# Patient Record
Sex: MALE | Race: WHITE | URBAN - METROPOLITAN AREA
[De-identification: names, ages, dates, MRNs, and addresses within clinical notes are randomized per-mention and may not be internally consistent; named-entity substitution may affect disease eponyms.]

---

## 2022-03-28 ENCOUNTER — TRANSCRIBE ORDER (OUTPATIENT)
Dept: SCHEDULING | Age: 42
End: 2022-03-28

## 2022-03-28 DIAGNOSIS — R10.9 UNSPECIFIED ABDOMINAL PAIN: Primary | ICD-10-CM

## 2022-04-07 ENCOUNTER — APPOINTMENT (OUTPATIENT)
Dept: CT IMAGING | Age: 42
End: 2022-04-07
Attending: FAMILY MEDICINE
Payer: COMMERCIAL

## 2022-04-07 ENCOUNTER — HOSPITAL ENCOUNTER (EMERGENCY)
Age: 42
Discharge: HOME | End: 2022-04-07
Attending: FAMILY MEDICINE
Payer: COMMERCIAL

## 2022-04-07 VITALS
SYSTOLIC BLOOD PRESSURE: 133 MMHG | TEMPERATURE: 98.2 F | HEIGHT: 69 IN | RESPIRATION RATE: 18 BRPM | BODY MASS INDEX: 23.7 KG/M2 | HEART RATE: 62 BPM | WEIGHT: 160 LBS | DIASTOLIC BLOOD PRESSURE: 89 MMHG | OXYGEN SATURATION: 100 %

## 2022-04-07 DIAGNOSIS — S32.010S CLOSED WEDGE COMPRESSION FRACTURE OF L1 VERTEBRA, SEQUELA: ICD-10-CM

## 2022-04-07 DIAGNOSIS — M50.30 DEGENERATIVE DISC DISEASE, CERVICAL: Primary | ICD-10-CM

## 2022-04-07 PROCEDURE — 99284 EMERGENCY DEPT VISIT MOD MDM: CPT

## 2022-04-07 PROCEDURE — 72125 CT NECK SPINE W/O DYE: CPT

## 2022-04-07 PROCEDURE — 72131 CT LUMBAR SPINE W/O DYE: CPT

## 2022-04-07 PROCEDURE — 70450 CT HEAD/BRAIN W/O DYE: CPT

## 2022-04-07 PROCEDURE — 74011250636 HC RX REV CODE- 250/636: Performed by: FAMILY MEDICINE

## 2022-04-07 PROCEDURE — 96372 THER/PROPH/DIAG INJ SC/IM: CPT

## 2022-04-07 RX ORDER — KETOROLAC TROMETHAMINE 30 MG/ML
30 INJECTION, SOLUTION INTRAMUSCULAR; INTRAVENOUS
Status: COMPLETED | OUTPATIENT
Start: 2022-04-07 | End: 2022-04-07

## 2022-04-07 RX ORDER — NAPROXEN 500 MG/1
500 TABLET ORAL
Qty: 20 TABLET | Refills: 0 | Status: SHIPPED | OUTPATIENT
Start: 2022-04-07 | End: 2022-04-17

## 2022-04-07 RX ORDER — PREDNISONE 20 MG/1
40 TABLET ORAL DAILY
Qty: 10 TABLET | Refills: 0 | Status: SHIPPED | OUTPATIENT
Start: 2022-04-07 | End: 2022-04-12

## 2022-04-07 RX ADMIN — KETOROLAC TROMETHAMINE 30 MG: 30 INJECTION, SOLUTION INTRAMUSCULAR; INTRAVENOUS at 09:25

## 2022-04-07 NOTE — DISCHARGE INSTRUCTIONS
Thank you! Thank you for allowing me to care for you in the emergency department. I sincerely hope that you are satisfied with your visit today. It is my goal to provide you with excellent care. Below you will find a list of your labs and imaging from your visit today. Should you have any questions regarding these results please do not hesitate to call the emergency department. Labs -   No results found for this or any previous visit (from the past 12 hour(s)). Radiologic Studies -   CT SPINE CERV WO CONT   Final Result   No demonstrated acute lesion. Please see above. CT SPINE LUMB WO CONT   Final Result   No evident acute injury               CT HEAD WO CONT   Final Result   No evidence of acute intracranial event. CT Results  (Last 48 hours)                 04/07/22 0846  CT SPINE CERV WO CONT Final result    Impression:  No demonstrated acute lesion. Please see above. Narrative:  Exam: Cervical spine CT without intravenous contrast       Comparison: None available       Dose reduction: All CT scans at this facility are performed using dose reduction   optimization techniques as appropriate to perform the exam including the   following: automated exposure control, adjustments of the mA and/or kV according   to patient size, or use of iterative reconstruction technique. Findings: Multilevel degenerative disc disease and facet joint osteoarthritis. There is no recent fracture or significant focal bony lesion. There is no   malalignment or distraction. There is no demonstrated prevertebral soft tissue   swelling. Please note that CT without myelographic contrast may be insensitive   to the presence of spinal canal or paraspinous soft tissue pathology , though   none of significance is suggested by this examination. If there is clinical   concern for such soft tissue disease, MRI may prove useful in this regard, as   indicated. Anne Benson            04/07/22 0846  CT SPINE LUMB WO CONT Final result    Impression:  No evident acute injury                   Narrative:  Exam: Lumbar spine CT without intravenous or myelographic contrast       Comparison: None available       Dose reduction: All CT scans at this facility are performed using dose reduction   optimization techniques as appropriate to perform the exam including the   following: automated exposure control, adjustments of the mA and/or kV according   to patient size, or use of iterative reconstruction technique. Findings: There is 33% anterior wedging deformity of the L1 vertebral body, in   what appears to be a developmental phenomenon and less likely remote fracture. There is no recent fracture, malalignment or distraction. There is no evident   disc herniation. There is no spinal canal or neural outlet foraminal stenosis. There is no significant paraspinous soft tissue lesion. 04/07/22 0840  CT HEAD WO CONT Final result    Impression:  No evidence of acute intracranial event. Narrative:  Exam: Head CT without intravenous contrast       Comparison: None       Dose reduction: All CT scans at this facility are performed using dose reduction   optimization techniques as appropriate to perform the exam including the   following: automated exposure control, adjustments of the mA and/or kV according   to patient size, or use of iterative reconstruction technique. Findings: The exam is negative for evidence of intracranial mass, hemorrhage,   recent infarct, or significant asymmetric lesion. Ventricles and subarachnoid   spaces are within normal limits. . The skull base and calvarium are   unremarkable. CXR Results  (Last 48 hours)      None               If you feel that you have not received excellent quality care or timely care, please ask to speak to the nurse manager. Please choose us in the future for your continued health care needs. ------------------------------------------------------------------------------------------------------------  The exam and treatment you received in the Emergency Department were for an urgent problem and are not intended as complete care. It is important that you follow-up with a doctor, nurse practitioner, or physician assistant to:  (1) confirm your diagnosis,  (2) re-evaluation of changes in your illness and treatment, and  (3) for ongoing care. If your symptoms become worse or you do not improve as expected and you are unable to reach your usual health care provider, you should return to the Emergency Department. We are available 24 hours a day. Please take your discharge instructions with you when you go to your follow-up appointment. If you have any problem arranging a follow-up appointment, contact the Emergency Department immediately. If a prescription has been provided, please have it filled as soon as possible to prevent a delay in treatment. Read the entire medication instruction sheet provided to you by the pharmacy. If you have any questions or reservations about taking the medication due to side effects or interactions with other medications, please call your primary care physician or contact the ER to speak with the charge nurse. Make an appointment with your family doctor or the physician you were referred to for follow-up of this visit as instructed on your discharge paperwork, as this is a mandatory follow-up. Return to the ER if you are unable to be seen or if you are unable to be seen in a timely manner. If you have any problem arranging the follow-up visit, contact the Emergency Department immediately.

## 2022-04-07 NOTE — Clinical Note
[address] [facility] EMERGENCY DEPARTMENT  [place] [facility] [address]  [phone]    Work/School Note    Date: 4/7/2022    To Whom It May concern:    Colt Burton was seen and treated today in the emergency room by the following provider(s):  Attending Provider: Geovanni Crowder DO. Colt Burton is excused from work/school on 4/7/2022 through 4/9/2022. He is medically clear to return to work/school on 4/10/2022.          Sincerely,          Cali Latham DO
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
## 2022-04-30 ENCOUNTER — TELEPHONE ENCOUNTER (OUTPATIENT)
Dept: URBAN - METROPOLITAN AREA CLINIC 121 | Facility: CLINIC | Age: 42
End: 2022-04-30

## 2022-05-01 ENCOUNTER — TELEPHONE ENCOUNTER (OUTPATIENT)
Dept: URBAN - METROPOLITAN AREA CLINIC 121 | Facility: CLINIC | Age: 42
End: 2022-05-01

## 2023-02-18 ENCOUNTER — TRANSCRIBE ORDER (OUTPATIENT)
Dept: EMERGENCY DEPT | Age: 43
End: 2023-02-18

## 2023-02-18 ENCOUNTER — HOSPITAL ENCOUNTER (OUTPATIENT)
Dept: GENERAL RADIOLOGY | Age: 43
End: 2023-02-18
Payer: COMMERCIAL

## 2023-02-18 ENCOUNTER — HOSPITAL ENCOUNTER (OUTPATIENT)
Dept: GENERAL RADIOLOGY | Age: 43
Discharge: HOME | End: 2023-02-18
Payer: COMMERCIAL

## 2023-02-18 ENCOUNTER — HOSPITAL ENCOUNTER (OUTPATIENT)
Dept: GENERAL RADIOLOGY | Age: 43
End: 2023-02-18
Attending: FAMILY MEDICINE
Payer: COMMERCIAL

## 2023-02-18 DIAGNOSIS — M54.16 RADICULOPATHY OF LUMBAR REGION: Primary | ICD-10-CM

## 2023-02-18 DIAGNOSIS — M54.16 RADICULOPATHY, LUMBAR REGION: ICD-10-CM

## 2023-02-18 DIAGNOSIS — M25.551 PAIN IN JOINT OF RIGHT HIP: ICD-10-CM

## 2023-02-18 DIAGNOSIS — M54.16 RADICULOPATHY, LUMBAR REGION: Primary | ICD-10-CM

## 2023-02-18 DIAGNOSIS — M25.551 PAIN IN RIGHT HIP: ICD-10-CM

## 2023-02-18 DIAGNOSIS — M54.16 RADICULOPATHY OF LUMBAR REGION: ICD-10-CM

## 2023-02-18 PROCEDURE — 72100 X-RAY EXAM L-S SPINE 2/3 VWS: CPT

## 2023-02-18 PROCEDURE — 73502 X-RAY EXAM HIP UNI 2-3 VIEWS: CPT

## 2024-02-06 ENCOUNTER — NURSE ONLY (OUTPATIENT)
Age: 44
End: 2024-02-06

## 2024-02-06 DIAGNOSIS — Z31.440 ENCOUNTER OF MALE FOR TESTING FOR GENETIC DISEASE CARRIER STATUS FOR PROCREATIVE MANAGEMENT: Primary | ICD-10-CM

## 2024-02-15 LAB
EGFR GENE MUT TESTED BLD/T: NEGATIVE
KRAS GENE MUT ANL BLD/T: NEGATIVE
Lab: NEGATIVE
Lab: NORMAL
MICROARRAY PLATFORM: NEGATIVE
NTRA ALPHA-THALASSEMIA: NEGATIVE
NTRA BATTEN DISEASE (NEURONAL CEROID LIPOFUSCINOSIS, CLN3-RELATED): NEGATIVE
NTRA BETA-HEMOGLOBINOPATHIES: NEGATIVE
NTRA BLOOM SYNDROME: NEGATIVE
NTRA CANAVAN DISEASE: NEGATIVE
NTRA CYSTIC FIBROSIS: NEGATIVE
NTRA FAMILIAL DYSAUTONOMIA: NEGATIVE
NTRA FANCONI ANEMIA, GROUP C: NEGATIVE
NTRA GALACTOSEMIA: NEGATIVE
NTRA GAUCHER DISEASE: NEGATIVE
NTRA GLYCOGEN STORAGE DISEASE, TYPE 1A: NEGATIVE
NTRA ISOVALERIC ACIDEMIA: NEGATIVE
NTRA MEDIUM CHAIN ACYL-COA DEHYDROGENASE DEFICIENCY: NEGATIVE
NTRA METHYLMALONIC ACIDURIA AND HOMOCYSTINURIA, TYPE CBLC: NEGATIVE
NTRA MUCOLIPIDOSIS, TYPE IV: NEGATIVE
NTRA POLYCYSTIC KIDNEY DISEASE, AUTOSOMAL RECESSIVE: NEGATIVE
NTRA SMITH-LEMLI-OPITZ SYNDROME: NEGATIVE
NTRA SPINAL MUSCULAR ATROPHY: NEGATIVE
NTRA TAY-SACHS DISEASE: NEGATIVE
NTRA TYROSINEMIA, TYPE I: NEGATIVE
NTRA ZELLWEGER SPECTRUM DISORDERS, PEX1-RELATED: NEGATIVE

## 2024-02-19 ENCOUNTER — TRANSCRIBE ORDERS (OUTPATIENT)
Facility: HOSPITAL | Age: 44
End: 2024-02-19

## 2024-02-19 ENCOUNTER — HOSPITAL ENCOUNTER (OUTPATIENT)
Facility: HOSPITAL | Age: 44
Discharge: HOME | End: 2024-02-22
Payer: COMMERCIAL

## 2024-02-19 DIAGNOSIS — M79.672 PAIN IN LEFT FOOT: Primary | ICD-10-CM

## 2024-02-19 DIAGNOSIS — M79.672 PAIN IN LEFT FOOT: ICD-10-CM

## 2024-02-19 PROCEDURE — 73630 X-RAY EXAM OF FOOT: CPT

## 2024-06-07 ENCOUNTER — HOSPITAL ENCOUNTER (OUTPATIENT)
Facility: HOSPITAL | Age: 44
Discharge: HOME | End: 2024-06-07
Payer: COMMERCIAL

## 2024-06-07 DIAGNOSIS — D48.0 NEOPLASM OF UNCERTAIN BEHAVIOR OF BONE AND ARTICULAR CARTILAGE: ICD-10-CM

## 2024-06-07 PROCEDURE — A9579 GAD-BASE MR CONTRAST NOS,1ML: HCPCS | Performed by: FAMILY MEDICINE

## 2024-06-07 PROCEDURE — 6360000004 HC RX CONTRAST MEDICATION: Performed by: FAMILY MEDICINE

## 2024-06-07 PROCEDURE — 72158 MRI LUMBAR SPINE W/O & W/DYE: CPT

## 2024-06-07 RX ADMIN — GADOTERIDOL 15 ML: 279.3 INJECTION, SOLUTION INTRAVENOUS at 17:08

## 2025-01-18 ENCOUNTER — HOSPITAL ENCOUNTER (EMERGENCY)
Facility: HOSPITAL | Age: 45
Discharge: HOME | End: 2025-01-18
Attending: EMERGENCY MEDICINE
Payer: COMMERCIAL

## 2025-01-18 VITALS
TEMPERATURE: 97.7 F | BODY MASS INDEX: 25.62 KG/M2 | DIASTOLIC BLOOD PRESSURE: 89 MMHG | HEART RATE: 68 BPM | HEIGHT: 69 IN | WEIGHT: 173 LBS | OXYGEN SATURATION: 98 % | RESPIRATION RATE: 16 BRPM | SYSTOLIC BLOOD PRESSURE: 160 MMHG

## 2025-01-18 DIAGNOSIS — L02.91 ABSCESS: Primary | ICD-10-CM

## 2025-01-18 PROCEDURE — 99283 EMERGENCY DEPT VISIT LOW MDM: CPT

## 2025-01-18 RX ORDER — IBUPROFEN 800 MG/1
800 TABLET, FILM COATED ORAL 3 TIMES DAILY PRN
Qty: 90 TABLET | Refills: 0 | Status: SHIPPED | OUTPATIENT
Start: 2025-01-18

## 2025-01-18 RX ORDER — BUPRENORPHINE AND NALOXONE 8; 2 MG/1; MG/1
1 FILM, SOLUBLE BUCCAL; SUBLINGUAL 2 TIMES DAILY
COMMUNITY

## 2025-01-18 RX ORDER — GABAPENTIN 300 MG/1
300 CAPSULE ORAL 3 TIMES DAILY
COMMUNITY

## 2025-01-18 RX ORDER — SULFAMETHOXAZOLE AND TRIMETHOPRIM 800; 160 MG/1; MG/1
1 TABLET ORAL 2 TIMES DAILY
Qty: 20 TABLET | Refills: 0 | Status: SHIPPED | OUTPATIENT
Start: 2025-01-18 | End: 2025-01-28

## 2025-01-18 ASSESSMENT — PAIN - FUNCTIONAL ASSESSMENT: PAIN_FUNCTIONAL_ASSESSMENT: ACTIVITIES ARE NOT PREVENTED

## 2025-01-18 ASSESSMENT — ENCOUNTER SYMPTOMS
SORE THROAT: 0
BACK PAIN: 0
EYE PAIN: 0
COUGH: 0
SHORTNESS OF BREATH: 0
DIARRHEA: 0
VOMITING: 0
COLOR CHANGE: 0
NAUSEA: 0
RHINORRHEA: 0
ABDOMINAL PAIN: 0

## 2025-01-18 ASSESSMENT — PAIN DESCRIPTION - ORIENTATION: ORIENTATION: LEFT

## 2025-01-18 ASSESSMENT — LIFESTYLE VARIABLES
HOW MANY STANDARD DRINKS CONTAINING ALCOHOL DO YOU HAVE ON A TYPICAL DAY: PATIENT DOES NOT DRINK
HOW OFTEN DO YOU HAVE A DRINK CONTAINING ALCOHOL: NEVER

## 2025-01-18 ASSESSMENT — PAIN DESCRIPTION - PAIN TYPE: TYPE: ACUTE PAIN

## 2025-01-18 ASSESSMENT — PAIN DESCRIPTION - LOCATION: LOCATION: GROIN

## 2025-01-18 ASSESSMENT — PAIN DESCRIPTION - DESCRIPTORS: DESCRIPTORS: TENDER

## 2025-01-18 ASSESSMENT — PAIN DESCRIPTION - FREQUENCY: FREQUENCY: CONTINUOUS

## 2025-01-18 ASSESSMENT — PAIN SCALES - GENERAL: PAINLEVEL_OUTOF10: 6

## 2025-01-18 NOTE — ED PROVIDER NOTES
[facility] EMERGENCY DEPARTMENT  EMERGENCY DEPARTMENT ENCOUNTER      Pt Name: Kenan Obrien  MRN: [id number]  Birthdate 1980  Date of evaluation: 1/18/2025  Provider: Chuy Rosales MD    CHIEF COMPLAINT       Chief Complaint   Patient presents with    Abscess     left         HISTORY OF PRESENT ILLNESS   (Location/Symptom, Timing/Onset, Context/Setting, Quality, Duration, Modifying Factors, Severity)  Note limiting factors.   44-year-old male presents with an abscess of the left groin.  He has had previous abscesses of the groin requiring incision and drainage on 1 occasion.  He is otherwise stable with no fever.  The wound has opened a little bit has a little bit of draining on its own.    The history is provided by the patient.   Abscess  Location:  Torso  Torso abscess location: Left groin.  Abscess quality: draining, painful and redness    Abscess quality: no fluctuance, no induration, no itching, no warmth and not weeping    Red streaking: no    Duration:  1 week  Pain details:     Quality:  Sharp and throbbing    Severity:  Moderate    Timing:  Constant    Progression:  Worsening  Chronicity:  New  Context: not diabetes and not immunosuppression    Relieved by:  Nothing  Worsened by:  Nothing  Ineffective treatments:  None tried  Associated symptoms: no fatigue, no fever, no nausea and no vomiting          Review of External Medical Records:     Nursing Notes were reviewed.    REVIEW OF SYSTEMS    (2-9 systems for level 4, 10 or more for level 5)     Review of Systems   Constitutional:  Negative for fatigue and fever.   HENT:  Negative for ear pain, rhinorrhea and sore throat.    Eyes:  Negative for pain and visual disturbance.   Respiratory:  Negative for cough and shortness of breath.    Cardiovascular:  Negative for chest pain.   Gastrointestinal:  Negative for abdominal pain, diarrhea, nausea and vomiting.   Genitourinary:  Negative for dysuria.   Musculoskeletal:  Negative for arthralgias,

## 2025-01-18 NOTE — ED TRIAGE NOTES
Pt ambulated to the treatment area with a steady gate. Pt states \"I think I have an abscess on my left groin it started 5 days ago. Last week I was also diagnosed with shingles left chest area the doctor put me on antiviral already.\" Pt denies fevers.